# Patient Record
Sex: MALE | Race: WHITE | NOT HISPANIC OR LATINO | URBAN - METROPOLITAN AREA
[De-identification: names, ages, dates, MRNs, and addresses within clinical notes are randomized per-mention and may not be internally consistent; named-entity substitution may affect disease eponyms.]

---

## 2023-06-02 ENCOUNTER — OFFICE VISIT (OUTPATIENT)
Dept: URBAN - METROPOLITAN AREA CLINIC 70 | Facility: CLINIC | Age: 78
End: 2023-06-02

## 2023-06-04 ENCOUNTER — OUT OF OFFICE VISIT (OUTPATIENT)
Dept: URBAN - METROPOLITAN AREA MEDICAL CENTER 16 | Facility: MEDICAL CENTER | Age: 78
End: 2023-06-04

## 2023-06-04 ENCOUNTER — CLAIMS CREATED FROM THE CLAIM WINDOW (OUTPATIENT)
Dept: URBAN - METROPOLITAN AREA MEDICAL CENTER 16 | Facility: MEDICAL CENTER | Age: 78
End: 2023-06-04
Payer: MEDICARE

## 2023-06-04 DIAGNOSIS — R11.2 ACUTE NAUSEA WITH NONBILIOUS VOMITING: ICD-10-CM

## 2023-06-04 DIAGNOSIS — Z90.49 ABSENCE OF GALLBLADDER: ICD-10-CM

## 2023-06-04 DIAGNOSIS — K21.9 ACID REFLUX: ICD-10-CM

## 2023-06-04 DIAGNOSIS — R13.19 CERVICAL DYSPHAGIA: ICD-10-CM

## 2023-06-04 PROCEDURE — 99232 SBSQ HOSP IP/OBS MODERATE 35: CPT | Performed by: INTERNAL MEDICINE

## 2023-06-04 PROCEDURE — 43249 ESOPH EGD DILATION <30 MM: CPT | Performed by: INTERNAL MEDICINE

## 2023-06-19 ENCOUNTER — WEB ENCOUNTER (OUTPATIENT)
Dept: URBAN - METROPOLITAN AREA CLINIC 70 | Facility: CLINIC | Age: 78
End: 2023-06-19

## 2023-06-19 ENCOUNTER — OFFICE VISIT (OUTPATIENT)
Dept: URBAN - METROPOLITAN AREA CLINIC 70 | Facility: CLINIC | Age: 78
End: 2023-06-19
Payer: MEDICARE

## 2023-06-19 ENCOUNTER — DASHBOARD ENCOUNTERS (OUTPATIENT)
Age: 78
End: 2023-06-19

## 2023-06-19 VITALS
BODY MASS INDEX: 24.85 KG/M2 | WEIGHT: 167.8 LBS | HEIGHT: 69 IN | SYSTOLIC BLOOD PRESSURE: 96 MMHG | OXYGEN SATURATION: 97 % | DIASTOLIC BLOOD PRESSURE: 67 MMHG | HEART RATE: 61 BPM

## 2023-06-19 DIAGNOSIS — R11.0 NAUSEA: ICD-10-CM

## 2023-06-19 PROCEDURE — 99213 OFFICE O/P EST LOW 20 MIN: CPT | Performed by: INTERNAL MEDICINE

## 2023-06-19 RX ORDER — SITAGLIPTIN 50 MG/1
AS DIRECTED TABLET, FILM COATED ORAL
Status: ACTIVE | COMMUNITY

## 2023-06-19 RX ORDER — GABAPENTIN 300 MG/1
1 CAPSULE CAPSULE ORAL ONCE A DAY
Status: ACTIVE | COMMUNITY

## 2023-06-19 RX ORDER — TAMSULOSIN HYDROCHLORIDE 0.4 MG/1
1 CAPSULE CAPSULE ORAL ONCE A DAY
Status: ACTIVE | COMMUNITY

## 2023-06-19 RX ORDER — FOLIC ACID 1 MG/1
1 TABLET TABLET ORAL ONCE A DAY
Status: ACTIVE | COMMUNITY

## 2023-06-19 RX ORDER — FUROSEMIDE 40 MG/1
1 TABLET TABLET ORAL ONCE A DAY
Status: ON HOLD | COMMUNITY

## 2023-06-19 RX ORDER — POTASSIUM CHLORIDE 1.5 G/1.58G
1 PACKET WITH FOOD POWDER, FOR SOLUTION ORAL ONCE A DAY
Status: ACTIVE | COMMUNITY

## 2023-06-19 RX ORDER — ONDANSETRON 4 MG/1
1 TABLET ON THE TONGUE AND ALLOW TO DISSOLVE TABLET, ORALLY DISINTEGRATING ORAL ONCE A DAY
Status: ACTIVE | COMMUNITY

## 2023-06-19 RX ORDER — ATORVASTATIN CALCIUM, FILM COATED 40 MG/1
TAKE 1 TABLET BY MOUTH EVERY DAY AT NIGHT TABLET ORAL
Qty: 30 EACH | Refills: 0 | Status: ACTIVE | COMMUNITY

## 2023-06-19 RX ORDER — OMEPRAZOLE 40 MG/1
1 CAPSULE 30 MINUTES BEFORE MORNING MEAL CAPSULE, DELAYED RELEASE ORAL ONCE A DAY
Status: ACTIVE | COMMUNITY

## 2023-06-19 RX ORDER — ONDANSETRON 4 MG/1
1 TABLET ON THE TONGUE AND ALLOW TO DISSOLVE TABLET, ORALLY DISINTEGRATING ORAL
Qty: 30 | Refills: 1

## 2023-06-19 RX ORDER — BUMETANIDE 0.5 MG/1
1 TABLET TABLET ORAL ONCE A DAY
Status: ACTIVE | COMMUNITY

## 2023-06-19 NOTE — HPI-TODAY'S VISIT:
77 year old male with PHX significant for but not limited to hx of extensive abdominal surgeries with chronic PEG placement, dysphagia, severe MR and TR(undergoing evaluation for valve replacement). Patient is here for HD f/up. Admitted to hospital with c/o change in mental status and unsteady gait. Patient's change in mental status was determined to be secondary to acute metabolic encephalopathy. During hospitalization, underwent esophagram that showed non-specific narrowing at GE junction. Subsequent EGD showed no gross lesions in esophagus, empiric dilation was performed with a TTS dilator up to 16.5 mm. Unusual shape of stomach noted due to prior surgery, PEG in place, rest of exam noted normal. CT A/P during hospitalization did not show evidence of diaphragmatic hernia, showed b/l pleural effusion, mild diffuse edematous changes involving the mesentery  around the gastric antrum and pylorus and anasarca.  At today's visit, pt c/o nausea. He is intermittently experiencing nausea. He reports that he is using PEG for decompression of stomach every day but despite that he experiences nausea. Denies having any vomiting, abdominal pain, melena, hematochezia, weight loss, lack of appetite, heart burn, NSAID USE.  Medications reviewed.

## 2023-07-17 ENCOUNTER — OFFICE VISIT (OUTPATIENT)
Dept: URBAN - METROPOLITAN AREA CLINIC 70 | Facility: CLINIC | Age: 78
End: 2023-07-17

## 2023-08-15 ENCOUNTER — OFFICE VISIT (OUTPATIENT)
Dept: URBAN - METROPOLITAN AREA CLINIC 70 | Facility: CLINIC | Age: 78
End: 2023-08-15

## 2023-09-06 ENCOUNTER — OFFICE VISIT (OUTPATIENT)
Dept: URBAN - METROPOLITAN AREA CLINIC 70 | Facility: CLINIC | Age: 78
End: 2023-09-06